# Patient Record
Sex: FEMALE | Race: WHITE | URBAN - METROPOLITAN AREA
[De-identification: names, ages, dates, MRNs, and addresses within clinical notes are randomized per-mention and may not be internally consistent; named-entity substitution may affect disease eponyms.]

---

## 2023-01-26 ENCOUNTER — WEB ENCOUNTER (OUTPATIENT)
Dept: URBAN - METROPOLITAN AREA CLINIC 54 | Facility: CLINIC | Age: 23
End: 2023-01-26

## 2023-01-26 ENCOUNTER — CLAIMS CREATED FROM THE CLAIM WINDOW (OUTPATIENT)
Dept: URBAN - METROPOLITAN AREA CLINIC 54 | Facility: CLINIC | Age: 23
End: 2023-01-26
Payer: COMMERCIAL

## 2023-01-26 VITALS
WEIGHT: 127.4 LBS | HEART RATE: 69 BPM | HEIGHT: 64 IN | DIASTOLIC BLOOD PRESSURE: 71 MMHG | TEMPERATURE: 97.5 F | BODY MASS INDEX: 21.75 KG/M2 | SYSTOLIC BLOOD PRESSURE: 110 MMHG

## 2023-01-26 DIAGNOSIS — R11.0 NAUSEA WITHOUT VOMITING: ICD-10-CM

## 2023-01-26 DIAGNOSIS — R10.13 EPIGASTRIC PAIN: ICD-10-CM

## 2023-01-26 PROCEDURE — 99244 OFF/OP CNSLTJ NEW/EST MOD 40: CPT | Performed by: INTERNAL MEDICINE

## 2023-01-26 PROCEDURE — 99244 OFF/OP CNSLTJ NEW/EST MOD 40: CPT

## 2023-01-26 PROCEDURE — 99204 OFFICE O/P NEW MOD 45 MIN: CPT | Performed by: INTERNAL MEDICINE

## 2023-01-26 RX ORDER — DICYCLOMINE HYDROCHLORIDE 10 MG/1
1 TABLET CAPSULE ORAL THREE TIMES A DAY
Status: ACTIVE | COMMUNITY

## 2023-01-26 RX ORDER — PANTOPRAZOLE SODIUM 40 MG/1
1 TABLET TABLET, DELAYED RELEASE ORAL TWICE A DAY
Status: ACTIVE | COMMUNITY

## 2023-01-26 NOTE — HPI-TODAY'S VISIT:
1/26/23: Patient was referred by RADHA Campos for RUQ and epigastric pain. A copy of this document will be sent to the physician. Pt is a 21 yo female with PMH of CCY in Sept 2022 for biliary dyskinesia and chronic cholecystitis without cholelithiasis with Dr. Pereyra who presents with ongoing abd pain and nausea. Pt states the sharp/crampy RUQ pain resolved after surgery and she felt good for about a week, but then then started having intermittent dull epigastric pain/pressure a few times a week. Pain occurs mostly at bedtime and resolves by the time she wakes up. Not necessarily related to eating. Rated about a 5/10 in severity. Also has constant nausea without vomiting. Symptoms did improve with pantoprazole 40mg QD to BID, but PCP discontinued and advised pt to try GF diet. This has helped signficantly and pt has worse pain when she eats gluten. She is also eating 6 smaller meals instead of 1 large meal. Pt does not take frequent NSAIDs, drink etoh, or use tobacco or marijuana. Denies change in bowel habits, hematochezia, melena, and unintentional weight loss. Denies family hx of celiac disease or IBD.  Recent workup includes an abd MRI that was reportedly normal, negative celiac panel (prior to GF diet), normal LFTs, negative H pylori POC test. EGD many years ago.

## 2023-02-02 ENCOUNTER — OFFICE VISIT (OUTPATIENT)
Dept: URBAN - METROPOLITAN AREA SURGERY CENTER 14 | Facility: SURGERY CENTER | Age: 23
End: 2023-02-02

## 2023-02-06 ENCOUNTER — OFFICE VISIT (OUTPATIENT)
Dept: URBAN - METROPOLITAN AREA SURGERY CENTER 14 | Facility: SURGERY CENTER | Age: 23
End: 2023-02-06

## 2023-03-02 ENCOUNTER — OFFICE VISIT (OUTPATIENT)
Dept: URBAN - METROPOLITAN AREA CLINIC 54 | Facility: CLINIC | Age: 23
End: 2023-03-02

## 2023-05-16 ENCOUNTER — OFFICE VISIT (OUTPATIENT)
Dept: URBAN - METROPOLITAN AREA CLINIC 54 | Facility: CLINIC | Age: 23
End: 2023-05-16

## 2023-06-20 ENCOUNTER — LAB OUTSIDE AN ENCOUNTER (OUTPATIENT)
Dept: URBAN - METROPOLITAN AREA CLINIC 54 | Facility: CLINIC | Age: 23
End: 2023-06-20

## 2023-06-20 ENCOUNTER — DASHBOARD ENCOUNTERS (OUTPATIENT)
Age: 23
End: 2023-06-20

## 2023-06-20 ENCOUNTER — OFFICE VISIT (OUTPATIENT)
Dept: URBAN - METROPOLITAN AREA CLINIC 54 | Facility: CLINIC | Age: 23
End: 2023-06-20
Payer: COMMERCIAL

## 2023-06-20 VITALS
BODY MASS INDEX: 22.47 KG/M2 | TEMPERATURE: 97.1 F | DIASTOLIC BLOOD PRESSURE: 72 MMHG | HEART RATE: 84 BPM | SYSTOLIC BLOOD PRESSURE: 117 MMHG | WEIGHT: 131.6 LBS | HEIGHT: 64 IN

## 2023-06-20 DIAGNOSIS — R11.0 NAUSEA WITHOUT VOMITING: ICD-10-CM

## 2023-06-20 DIAGNOSIS — R10.13 EPIGASTRIC PAIN: ICD-10-CM

## 2023-06-20 PROCEDURE — 99214 OFFICE O/P EST MOD 30 MIN: CPT

## 2023-06-20 RX ORDER — PANTOPRAZOLE SODIUM 40 MG/1
1 TABLET TABLET, DELAYED RELEASE ORAL TWICE A DAY
Status: DISCONTINUED | COMMUNITY

## 2023-06-20 RX ORDER — DICYCLOMINE HYDROCHLORIDE 10 MG/1
1 TABLET CAPSULE ORAL THREE TIMES A DAY
Status: ACTIVE | COMMUNITY

## 2023-06-20 NOTE — HPI-TODAY'S VISIT:
1/26/23: Patient was referred by RADHA Campos for RUQ and epigastric pain. A copy of this document will be sent to the physician. Pt is a 21 yo female with PMH of CCY in Sept 2022 for biliary dyskinesia and chronic cholecystitis without cholelithiasis with Dr. Pereyra who presents with ongoing abd pain and nausea. Pt states the sharp/crampy RUQ pain resolved after surgery and she felt good for about a week, but then then started having intermittent dull epigastric pain/pressure a few times a week. Pain occurs mostly at bedtime and resolves by the time she wakes up. Not necessarily related to eating. Rated about a 5/10 in severity. Also has constant nausea without vomiting. Symptoms did improve with pantoprazole 40mg QD to BID, but PCP discontinued and advised pt to try GF diet. This has helped signficantly and pt has worse pain when she eats gluten. She is also eating 6 smaller meals instead of 1 large meal. Pt does not take frequent NSAIDs, drink etoh, or use tobacco or marijuana. Denies change in bowel habits, hematochezia, melena, and unintentional weight loss. Denies family hx of celiac disease or IBD.  Recent workup includes an abd MRI that was reportedly normal, negative celiac panel (prior to GF diet), normal LFTs, negative H pylori POC test. EGD many years ago.  6/20/23 Follow up: Patient was referred by RADHA Campos for epigastric pain. A copy of this document will be sent to the provider. Pt presents for ongoing postprandial epigastric pain and nausea without vomiting. EGD ordered at last visit but not completed. Symptoms unchanged. Pt has tried pantoprazole 40mg QD then BID for several months, omeprazole, esomeprazole (for a week), and famotidine without relief. Dicyclomine doesn't help much. Zofran does manage nausea. Not taking NSAIDs. Denies change in bowel habits, diarrhea, constipation, anorexia or unintentional weight loss.

## 2023-06-20 NOTE — PHYSICAL EXAM EYES:
Conjuntivae and eyelids appear normal,  Sclerae : White without injection Saucerization Excision Additional Text (Leave Blank If You Do Not Want): The margin was drawn around the clinically apparent lesion.  Incisions were then made along these lines, in a tangential fashion, to the appropriate tissue plane and the lesion was extirpated.